# Patient Record
Sex: FEMALE | Race: WHITE | NOT HISPANIC OR LATINO | Employment: PART TIME | URBAN - METROPOLITAN AREA
[De-identification: names, ages, dates, MRNs, and addresses within clinical notes are randomized per-mention and may not be internally consistent; named-entity substitution may affect disease eponyms.]

---

## 2018-10-26 ENCOUNTER — OFFICE VISIT (OUTPATIENT)
Dept: INTERNAL MEDICINE | Facility: CLINIC | Age: 48
End: 2018-10-26

## 2018-10-26 VITALS
HEART RATE: 86 BPM | WEIGHT: 104.6 LBS | BODY MASS INDEX: 17.86 KG/M2 | HEIGHT: 64 IN | SYSTOLIC BLOOD PRESSURE: 98 MMHG | OXYGEN SATURATION: 98 % | TEMPERATURE: 98.2 F | RESPIRATION RATE: 16 BRPM | DIASTOLIC BLOOD PRESSURE: 68 MMHG

## 2018-10-26 DIAGNOSIS — F41.9 ANXIETY: ICD-10-CM

## 2018-10-26 DIAGNOSIS — Z23 NEED FOR HEPATITIS A IMMUNIZATION: ICD-10-CM

## 2018-10-26 DIAGNOSIS — H53.2 DOUBLE VISION: Primary | ICD-10-CM

## 2018-10-26 DIAGNOSIS — Z23 FLU VACCINE NEED: ICD-10-CM

## 2018-10-26 DIAGNOSIS — E07.9 THYROID DISEASE: ICD-10-CM

## 2018-10-26 DIAGNOSIS — F32.A DEPRESSION, UNSPECIFIED DEPRESSION TYPE: ICD-10-CM

## 2018-10-26 DIAGNOSIS — E05.00 GRAVES DISEASE: ICD-10-CM

## 2018-10-26 LAB
25(OH)D3 SERPL-MCNC: 17.3 NG/ML
ALBUMIN SERPL-MCNC: 3.7 G/DL (ref 3.2–4.8)
ALBUMIN/GLOB SERPL: 1.8 G/DL (ref 1.5–2.5)
ALP SERPL-CCNC: 81 U/L (ref 25–100)
ALT SERPL W P-5'-P-CCNC: 30 U/L (ref 7–40)
ANION GAP SERPL CALCULATED.3IONS-SCNC: 7 MMOL/L (ref 3–11)
AST SERPL-CCNC: 49 U/L (ref 0–33)
BASOPHILS # BLD AUTO: 0.05 10*3/MM3 (ref 0–0.2)
BASOPHILS NFR BLD AUTO: 0.8 % (ref 0–1)
BILIRUB SERPL-MCNC: 0.2 MG/DL (ref 0.3–1.2)
BUN BLD-MCNC: 10 MG/DL (ref 9–23)
BUN/CREAT SERPL: 11.6 (ref 7–25)
CALCIUM SPEC-SCNC: 8.3 MG/DL (ref 8.7–10.4)
CHLORIDE SERPL-SCNC: 104 MMOL/L (ref 99–109)
CO2 SERPL-SCNC: 25 MMOL/L (ref 20–31)
CREAT BLD-MCNC: 0.86 MG/DL (ref 0.6–1.3)
DEPRECATED RDW RBC AUTO: 50.5 FL (ref 37–54)
EOSINOPHIL # BLD AUTO: 0.1 10*3/MM3 (ref 0–0.3)
EOSINOPHIL NFR BLD AUTO: 1.5 % (ref 0–3)
ERYTHROCYTE [DISTWIDTH] IN BLOOD BY AUTOMATED COUNT: 16.8 % (ref 11.3–14.5)
GFR SERPL CREATININE-BSD FRML MDRD: 71 ML/MIN/1.73
GLOBULIN UR ELPH-MCNC: 2.1 GM/DL
GLUCOSE BLD-MCNC: 69 MG/DL (ref 70–100)
HCT VFR BLD AUTO: 33 % (ref 34.5–44)
HGB BLD-MCNC: 10.2 G/DL (ref 11.5–15.5)
IMM GRANULOCYTES # BLD: 0.01 10*3/MM3 (ref 0–0.03)
IMM GRANULOCYTES NFR BLD: 0.2 % (ref 0–0.6)
LYMPHOCYTES # BLD AUTO: 1.9 10*3/MM3 (ref 0.6–4.8)
LYMPHOCYTES NFR BLD AUTO: 28.9 % (ref 24–44)
MCH RBC QN AUTO: 25.4 PG (ref 27–31)
MCHC RBC AUTO-ENTMCNC: 30.9 G/DL (ref 32–36)
MCV RBC AUTO: 82.3 FL (ref 80–99)
MONOCYTES # BLD AUTO: 0.43 10*3/MM3 (ref 0–1)
MONOCYTES NFR BLD AUTO: 6.5 % (ref 0–12)
NEUTROPHILS # BLD AUTO: 4.09 10*3/MM3 (ref 1.5–8.3)
NEUTROPHILS NFR BLD AUTO: 62.3 % (ref 41–71)
PLATELET # BLD AUTO: 410 10*3/MM3 (ref 150–450)
PMV BLD AUTO: 10.6 FL (ref 6–12)
POTASSIUM BLD-SCNC: 4.7 MMOL/L (ref 3.5–5.5)
PROT SERPL-MCNC: 5.8 G/DL (ref 5.7–8.2)
RBC # BLD AUTO: 4.01 10*6/MM3 (ref 3.89–5.14)
SODIUM BLD-SCNC: 136 MMOL/L (ref 132–146)
T4 FREE SERPL-MCNC: 0.31 NG/DL (ref 0.89–1.76)
TSH SERPL DL<=0.05 MIU/L-ACNC: 112.38 MIU/ML (ref 0.35–5.35)
VIT B12 BLD-MCNC: 799 PG/ML (ref 211–911)
WBC NRBC COR # BLD: 6.57 10*3/MM3 (ref 3.5–10.8)

## 2018-10-26 PROCEDURE — 80053 COMPREHEN METABOLIC PANEL: CPT | Performed by: NURSE PRACTITIONER

## 2018-10-26 PROCEDURE — 84443 ASSAY THYROID STIM HORMONE: CPT | Performed by: NURSE PRACTITIONER

## 2018-10-26 PROCEDURE — 82306 VITAMIN D 25 HYDROXY: CPT | Performed by: NURSE PRACTITIONER

## 2018-10-26 PROCEDURE — 90632 HEPA VACCINE ADULT IM: CPT | Performed by: NURSE PRACTITIONER

## 2018-10-26 PROCEDURE — 90674 CCIIV4 VAC NO PRSV 0.5 ML IM: CPT | Performed by: NURSE PRACTITIONER

## 2018-10-26 PROCEDURE — 82607 VITAMIN B-12: CPT | Performed by: NURSE PRACTITIONER

## 2018-10-26 PROCEDURE — 84439 ASSAY OF FREE THYROXINE: CPT | Performed by: NURSE PRACTITIONER

## 2018-10-26 PROCEDURE — 85025 COMPLETE CBC W/AUTO DIFF WBC: CPT | Performed by: NURSE PRACTITIONER

## 2018-10-26 PROCEDURE — 90472 IMMUNIZATION ADMIN EACH ADD: CPT | Performed by: NURSE PRACTITIONER

## 2018-10-26 PROCEDURE — 99203 OFFICE O/P NEW LOW 30 MIN: CPT | Performed by: NURSE PRACTITIONER

## 2018-10-26 PROCEDURE — 90471 IMMUNIZATION ADMIN: CPT | Performed by: NURSE PRACTITIONER

## 2018-10-26 RX ORDER — LEVOTHYROXINE SODIUM 100 MCG
100 TABLET ORAL DAILY
Qty: 30 TABLET | Refills: 2 | Status: SHIPPED | OUTPATIENT
Start: 2018-10-26 | End: 2018-12-19

## 2018-10-26 RX ORDER — VENLAFAXINE HYDROCHLORIDE 37.5 MG/1
CAPSULE, EXTENDED RELEASE ORAL
Qty: 60 CAPSULE | Refills: 2 | Status: SHIPPED | OUTPATIENT
Start: 2018-10-26 | End: 2018-11-06

## 2018-10-26 NOTE — PROGRESS NOTES
Subjective   Miley Hylton is a 47 y.o. female    Chief Complaint   Patient presents with   • Establish Care     Has been off all meds for about 1 month. Needs to get back on them. Having a lot of sx's.    • Graves' Disease     Was taking Levothyroxine 100mcg   • Anxiety and Depression     Was taking Celexa 20mg and Effexor 75mg BID. Did not care for the Celexa. She feels that it made her a little more depressed.      History of Present Illness     New pt here to establish care.     C/O double vision.  States that she has been dealing with this off and on for a couple of years.  She has seen her eye doctor and had multiple evaluations and adjustments to her lenses.  Nothing has helped.  It has always been attributed to her thyroid, but it did not improve when her levels were WNL    H/O Graves, Hypothyroid - originally dx'd in 2006 - radioactive iodine x 2.  Should be on Synthroid 100 mcg daily, but has been off of this med for > 1 month.  No recent labs    Anxiety/depression - Pt has a long standing h/o anxiety and depression.  She was previously on Celexa 20 mg daily along with Effexor 75 mg BID.  Felt that she did much better on Effexor alone, but has been off of this for > 1 month as well.      Past Medical History:   Diagnosis Date   • Anxiety    • Depression    • Graves disease     2 radioactive Iodine treatment done 2006 (Liquid form) and 2007 (orally)   • Thyroid disease      Past Surgical History:   Procedure Laterality Date   • CHOLECYSTECTOMY  2006       No Known Allergies    Social History     Social History   • Marital status: Single     Spouse name: N/A   • Number of children: N/A   • Years of education: N/A     Occupational History   • Not on file.     Social History Main Topics   • Smoking status: Never Smoker   • Smokeless tobacco: Never Used   • Alcohol use No   • Drug use: No   • Sexual activity: Not Currently      Comment: single     Other Topics Concern   • Not on file     Social History  Narrative   • No narrative on file     Family History   Problem Relation Age of Onset   • Hypertension Mother    • Stroke Mother    • Migraines Mother    • Heart attack Mother         x3   • Pulmonary embolism Mother    • Hypertension Father    • Diabetes Paternal Aunt    • Breast cancer Maternal Grandmother    • Diabetes Maternal Grandfather    • Breast cancer Paternal Grandmother         x2   • Diabetes Paternal Aunt    • Mental illness Brother          The following portions of the patient's history were reviewed and updated as appropriate: allergies, current medications, past family history, past medical history, past social history, past surgical history and problem list.    Current Outpatient Prescriptions:   •  SYNTHROID 100 MCG tablet, Take 1 tablet by mouth Daily., Disp: 30 tablet, Rfl: 2  •  venlafaxine XR (EFFEXOR XR) 37.5 MG 24 hr capsule, 1 PO daily x 2 weeks, then 2 PO daily, Disp: 60 capsule, Rfl: 2     Review of Systems   Constitutional: Negative for chills, fatigue and fever.   Eyes: Positive for visual disturbance.        Blurred/double vision   Respiratory: Negative for cough, chest tightness and shortness of breath.    Cardiovascular: Negative for chest pain.   Gastrointestinal: Negative for abdominal pain, diarrhea, nausea and vomiting.   Endocrine: Negative for cold intolerance and heat intolerance.   Musculoskeletal: Negative for arthralgias.   Neurological: Negative for dizziness.   Psychiatric/Behavioral: The patient is nervous/anxious.        Objective   Physical Exam   Constitutional: She is oriented to person, place, and time. She appears well-developed and well-nourished.   HENT:   Head: Normocephalic and atraumatic.   Eyes: Pupils are equal, round, and reactive to light. Conjunctivae and EOM are normal.   Neck: Normal range of motion.   Cardiovascular: Normal rate, regular rhythm and normal heart sounds.    Pulmonary/Chest: Effort normal and breath sounds normal.   Abdominal: Soft.  "Bowel sounds are normal.   Musculoskeletal: Normal range of motion.   Neurological: She is alert and oriented to person, place, and time. She has normal reflexes.   Skin: Skin is warm and dry.   Psychiatric: She has a normal mood and affect. Her behavior is normal. Judgment and thought content normal.     Vitals:    10/26/18 1053   BP: 98/68   Pulse: 86   Resp: 16   Temp: 98.2 °F (36.8 °C)   TempSrc: Temporal Artery    SpO2: 98%   Weight: 47.4 kg (104 lb 9.6 oz)   Height: 163 cm (64.17\")         Assessment/Plan   Miley was seen today for establish care, graves' disease and anxiety and depression.    Diagnoses and all orders for this visit:    Double vision  -     MRI Brain With & Without Contrast; Future    Thyroid disease  -     SYNTHROID 100 MCG tablet; Take 1 tablet by mouth Daily.  -     Ambulatory Referral to Endocrinology    Graves disease  -     SYNTHROID 100 MCG tablet; Take 1 tablet by mouth Daily.  -     Ambulatory Referral to Endocrinology    Anxiety  -     CBC & Differential  -     Comprehensive Metabolic Panel  -     Vitamin D 25 Hydroxy  -     Vitamin B12  -     TSH  -     T4, Free  -     venlafaxine XR (EFFEXOR XR) 37.5 MG 24 hr capsule; 1 PO daily x 2 weeks, then 2 PO daily  -     CBC Auto Differential    Depression, unspecified depression type  -     CBC & Differential  -     Comprehensive Metabolic Panel  -     Vitamin D 25 Hydroxy  -     Vitamin B12  -     TSH  -     T4, Free  -     venlafaxine XR (EFFEXOR XR) 37.5 MG 24 hr capsule; 1 PO daily x 2 weeks, then 2 PO daily  -     CBC Auto Differential    Flu vaccine need  -     Flucelvax Quad=>4Years ([id number])    Need for hepatitis A immunization  -     Hepatitis A Vaccine Adult IM      We will ck an MRI of the brain due to diplopia  I will restart her Synthroid and would like to keep this as brand only if possible  Labs sent  We will restart her Effexor 37.5 mg and increase after 2 weeks to 75 mg daily  Return in about 4 weeks (around 11/23/2018) " for Recheck; also needs a PE scheduled.

## 2018-10-29 ENCOUNTER — TELEPHONE (OUTPATIENT)
Dept: INTERNAL MEDICINE | Facility: CLINIC | Age: 48
End: 2018-10-29

## 2018-10-29 NOTE — TELEPHONE ENCOUNTER
Patient needs her pharmacy to be contacted for name only on the SYNTHROID 100 MCG tablet her insurance is not wanting to cover name brand and she has to have it. Also they are not wanting to cover her venlafaxine XR (EFFEXOR XR) 37.5 MG 24 hr capsule due to the way she takes it. She can be reached at [phone]

## 2018-10-29 NOTE — TELEPHONE ENCOUNTER
Prior Auth has been submitted for BRAND Synthroid and Venlafaxine 37.5mg taking 2 po qd.    I let patient know that this has been submitted I should hear something back within 24-48 hours. I told her that we may have to change the Venlafaxine to 75mg 1 po qd if this is not covered. I would let her know I soon as I hear back from [employer].

## 2018-11-05 ENCOUNTER — TELEPHONE (OUTPATIENT)
Dept: INTERNAL MEDICINE | Facility: CLINIC | Age: 48
End: 2018-11-05

## 2018-11-05 NOTE — TELEPHONE ENCOUNTER
Patient called and was wondering the status of the PA for her depression medication.  She is willing to get on a different medication if it would help the approval process with her insurance.

## 2018-11-06 RX ORDER — VENLAFAXINE HYDROCHLORIDE 75 MG/1
75 CAPSULE, EXTENDED RELEASE ORAL DAILY
Qty: 30 CAPSULE | Refills: 5 | Status: SHIPPED | OUTPATIENT
Start: 2018-11-06 | End: 2018-12-13

## 2018-11-07 ENCOUNTER — TELEPHONE (OUTPATIENT)
Dept: INTERNAL MEDICINE | Facility: CLINIC | Age: 48
End: 2018-11-07

## 2018-11-07 NOTE — TELEPHONE ENCOUNTER
The number listed for patient was a wrong number. I have sent letter via [facility] and also mailed her a letter as well and to call the office to update phone number.

## 2018-11-07 NOTE — TELEPHONE ENCOUNTER
----- Message from JEFFREY Perez sent at 11/5/2018 12:55 PM EST -----  Please let pt know that her TSH is very high (112) and her T4 is very low.  Please confirm that she did restart her Synthroid.  I would like to repeat her labs in 1 month to make sure it is moving in the right direction.  I have also referred her to endo.      Vit D is low.  I recommend OTC d3 5000 units daily    All other labs are WNL

## 2018-12-13 ENCOUNTER — OFFICE VISIT (OUTPATIENT)
Dept: INTERNAL MEDICINE | Facility: CLINIC | Age: 48
End: 2018-12-13

## 2018-12-13 VITALS
TEMPERATURE: 98.5 F | HEIGHT: 64 IN | OXYGEN SATURATION: 97 % | BODY MASS INDEX: 17.31 KG/M2 | HEART RATE: 70 BPM | WEIGHT: 101.4 LBS | SYSTOLIC BLOOD PRESSURE: 110 MMHG | DIASTOLIC BLOOD PRESSURE: 80 MMHG | RESPIRATION RATE: 16 BRPM

## 2018-12-13 DIAGNOSIS — F32.A DEPRESSION, UNSPECIFIED DEPRESSION TYPE: ICD-10-CM

## 2018-12-13 DIAGNOSIS — E07.9 THYROID DISEASE: ICD-10-CM

## 2018-12-13 DIAGNOSIS — H53.2 DIPLOPIA: ICD-10-CM

## 2018-12-13 DIAGNOSIS — F41.9 ANXIETY: Primary | ICD-10-CM

## 2018-12-13 LAB
T4 FREE SERPL-MCNC: 0.72 NG/DL (ref 0.89–1.76)
TSH SERPL DL<=0.05 MIU/L-ACNC: 74.64 MIU/ML (ref 0.35–5.35)

## 2018-12-13 PROCEDURE — 84439 ASSAY OF FREE THYROXINE: CPT | Performed by: NURSE PRACTITIONER

## 2018-12-13 PROCEDURE — 99214 OFFICE O/P EST MOD 30 MIN: CPT | Performed by: NURSE PRACTITIONER

## 2018-12-13 PROCEDURE — 84443 ASSAY THYROID STIM HORMONE: CPT | Performed by: NURSE PRACTITIONER

## 2018-12-13 RX ORDER — VENLAFAXINE HYDROCHLORIDE 150 MG/1
150 CAPSULE, EXTENDED RELEASE ORAL DAILY
Qty: 30 CAPSULE | Refills: 2 | Status: SHIPPED | OUTPATIENT
Start: 2018-12-13

## 2018-12-13 RX ORDER — ONDANSETRON 4 MG/1
4 TABLET, FILM COATED ORAL EVERY 8 HOURS PRN
Qty: 45 TABLET | Refills: 2 | Status: SHIPPED | OUTPATIENT
Start: 2018-12-13

## 2018-12-13 NOTE — PROGRESS NOTES
Subjective   Miley Hylton is a 47 y.o. female    Chief Complaint   Patient presents with   • Follow-up   • Anxiety and depression     Started on [id number]. She thinks it has helped, but wants to discuss maybe adding something else.    • Graves' Disease     Restarted BRAND Synthroid     History of Present Illness     Still C/O double vision.  States that she has been dealing with this off and on for a couple of years.  She has seen her eye doctor and had multiple evaluations and adjustments to her lenses.  Nothing has helped.  It has always been attributed to her thyroid, but it did not improve when her levels were WNL.  Has seen no change since restarting her Synthroid.  I ordered an MRI of her brain at last visit, but no one was able to get in touch with her to schedule.   I will reorder.     H/O Graves, Hypothyroid - originally dx'd in 2006 - radioactive iodine x 2.  Synthroid was restarted at last visit.  She is taking as directed.  States that she does feel slightly better.  We will repeat labs today.      Anxiety/depression - Pt has a long standing h/o anxiety and depression.  She was previously on Celexa 20 mg daily along with Effexor 75 mg BID.  Felt that she did much better on Effexor alone.  This was restarted at her last visit.  States that this has helped, but still feeling anxious and down at times.  No S/H ideations.        The following portions of the patient's history were reviewed and updated as appropriate: allergies, current medications, past family history, past medical history, past social history, past surgical history and problem list.    Current Outpatient Medications:   •  ondansetron (ZOFRAN) 4 MG tablet, Take 1 tablet by mouth Every 8 (Eight) Hours As Needed for Nausea or Vomiting., Disp: 45 tablet, Rfl: 2  •  SYNTHROID 100 MCG tablet, Take 1 tablet by mouth Daily., Disp: 30 tablet, Rfl: 2  •  venlafaxine XR (EFFEXOR XR) 150 MG 24 hr capsule, Take 1 capsule by mouth Daily., Disp: 30  "capsule, Rfl: 2     Review of Systems   Constitutional: Negative for chills, fatigue and fever.   Respiratory: Negative for cough, chest tightness and shortness of breath.    Cardiovascular: Negative for chest pain.   Gastrointestinal: Negative for abdominal pain, diarrhea, nausea and vomiting.   Endocrine: Negative for cold intolerance and heat intolerance.   Musculoskeletal: Negative for arthralgias.   Neurological: Negative for dizziness.       Objective   Physical Exam   Constitutional: She is oriented to person, place, and time. She appears well-developed and well-nourished.   HENT:   Head: Normocephalic and atraumatic.   Eyes: Conjunctivae and EOM are normal. Pupils are equal, round, and reactive to light.   Neck: Normal range of motion.   Cardiovascular: Normal rate, regular rhythm and normal heart sounds.   Pulmonary/Chest: Effort normal and breath sounds normal.   Abdominal: Soft. Bowel sounds are normal.   Musculoskeletal: Normal range of motion.   Neurological: She is alert and oriented to person, place, and time. She has normal reflexes.   Skin: Skin is warm and dry.   Psychiatric: She has a normal mood and affect. Her behavior is normal. Judgment and thought content normal.     Vitals:    12/13/18 1347   BP: 110/80   Pulse: 70   Resp: 16   Temp: 98.5 °F (36.9 °C)   TempSrc: Temporal   SpO2: 97%   Weight: 46 kg (101 lb 6.4 oz)   Height: 163 cm (64.17\")         Assessment/Plan   Miley was seen today for follow-up, anxiety and depression and graves' disease.    Diagnoses and all orders for this visit:    Anxiety  -     venlafaxine XR (EFFEXOR XR) 150 MG 24 hr capsule; Take 1 capsule by mouth Daily.    Depression, unspecified depression type  -     venlafaxine XR (EFFEXOR XR) 150 MG 24 hr capsule; Take 1 capsule by mouth Daily.    Thyroid disease  -     TSH  -     T4, Free    Diplopia  -     MRI Brain With & Without Contrast; Future    Other orders  -     ondansetron (ZOFRAN) 4 MG tablet; Take 1 tablet by " mouth Every 8 (Eight) Hours As Needed for Nausea or Vomiting.      Labs sent  Effexor increased to 150 mg daily  Zofran given per pt request for nausea  MRI of brain reordered  Return for Annual.

## 2018-12-19 ENCOUNTER — TELEPHONE (OUTPATIENT)
Dept: INTERNAL MEDICINE | Facility: CLINIC | Age: 48
End: 2018-12-19

## 2018-12-19 DIAGNOSIS — E03.9 ACQUIRED HYPOTHYROIDISM: Primary | ICD-10-CM

## 2018-12-19 RX ORDER — LEVOTHYROXINE SODIUM 112 MCG
112 TABLET ORAL DAILY
Qty: 30 TABLET | Refills: 2 | Status: SHIPPED | OUTPATIENT
Start: 2018-12-19

## 2018-12-19 NOTE — TELEPHONE ENCOUNTER
----- Message from JEFFREY Perez sent at 12/19/2018  9:42 AM EST -----  Thyroid labs do look better, but she still needs a higher dose of synthroid.  I will send this in.   We should repeat her labs again in 6 more weeks.  I will enter the orders and she can stop by for this

## 2018-12-19 NOTE — TELEPHONE ENCOUNTER
Called patient and left her a msg letting her know the results and that a higher dose of the synthroid has been sent in. I also told her that she can just stop by in 6 weeks for repeat labs and does not need an appointment. I told her she can call the office if she has any questions.     Lab orders are already in Epic.    I have also sent this to Miley via [employer]

## 2018-12-27 ENCOUNTER — HOSPITAL ENCOUNTER (OUTPATIENT)
Dept: MRI IMAGING | Facility: HOSPITAL | Age: 48
Discharge: HOME | End: 2018-12-27
Admitting: NURSE PRACTITIONER

## 2018-12-27 DIAGNOSIS — H53.2 DIPLOPIA: ICD-10-CM

## 2018-12-27 PROCEDURE — 70553 MRI BRAIN STEM W/O & W/DYE: CPT

## 2018-12-27 PROCEDURE — A9577 INJ MULTIHANCE: HCPCS | Performed by: NURSE PRACTITIONER

## 2018-12-27 PROCEDURE — 0 GADOBENATE DIMEGLUMINE 529 MG/ML SOLUTION: Performed by: NURSE PRACTITIONER

## 2018-12-27 RX ADMIN — GADOBENATE DIMEGLUMINE 10 ML: 529 INJECTION, SOLUTION INTRAVENOUS at 16:17

## 2018-12-28 ENCOUNTER — TELEPHONE (OUTPATIENT)
Dept: INTERNAL MEDICINE | Facility: CLINIC | Age: 48
End: 2018-12-28

## 2018-12-28 NOTE — TELEPHONE ENCOUNTER
----- Message from JEFFREY Perez sent at 12/28/2018 10:03 AM EST -----  MRI of the brain was normal